# Patient Record
(demographics unavailable — no encounter records)

---

## 2024-12-10 NOTE — ASSESSMENT
[FreeTextEntry1] : ===================== ASSESSMENT =====================  Post concussive syndrome  ===================== PLAN =====================  - MRI Brain - physical therapy - Advil  - employer is aware and is accomodating - RTC 2 months

## 2024-12-10 NOTE — HISTORY OF PRESENT ILLNESS
[FreeTextEntry1] : ====================== HPI ======================= 45F case manger with varicose veins, here with headache s/p fall 6 days ago.   Per patient, she fell on the concrete last week, Wed 12.4.2024. She was running across the street to get to a meeting and fell down on her right side, struck her head. It was a mechanical fall. She had no pre-fall symptoms, no LOC.  She was anxious, trying to make her meeting. Also was somewhat alarmed given that she has children and all of her family is abroad - some anxiety and a need to continue to function. She stood up right away and went to her work meeting. At the meeting, she started to feel dizzy (non vertiginous), felt pressure in R eye and R side of face. Severity 7/10. DId not seek medical care. The first 2 days, she took off work and she slept a lot. She has had pain in her eyes and band like pain across her head. Advil helped (she just took it at night x 2 days. Doesn't like meds). The R eye pain worsens with at night.   The headache has improved overall, is not as severe, not as constant. It is fluctuating. No visual symptoms. No phonophobia. Mild photophobia. No N/V. No neck pain. She has pain on her R side. She also wants to sleep a lot.   No personal history of migraine.   She is a  (SW) at [employer]. She states she is able to work and is not having particular problems.   Had an optho eval and neuro eval was recommended.   ================= - Meds: PRN ASA - All: NKDA - PMH: varicose veins -  Social: no etoh/smoking/drugs - Family History: no h/o neuro disorder, including no migraines =================

## 2024-12-10 NOTE — PHYSICAL EXAM
[FreeTextEntry1] : ============================= NEUROLOGICAL EXAMINATION ================================ [ ] GENERAL: alert, appears well, non-toxic [ ] BEHAVIOR: euthymic affect, pleasant and cooperative. No disinhibition, no agitation, no apathy.  [ ] COGNITION: alert, with good basic attention. Language fluent, with normal prosody and phrase length. Comprehension intact to grammatically complex questions and to complex commands. [ ] CRANIAL NERVES: PERRL, VFF (binocularly to finger count), EOMI, facial sensation intact bilaterally to PP, face symmetric, facial movements full, tongue protrudes midline [ ] MOTOR: Power 5/5 grossly all limbs without PND  [ ] SENSORY: PP intact all limbs [ ] CO-ORDINATION: FNF intact bilaterally [ ] GAIT: normal posture. normal, narrow based gait.